# Patient Record
Sex: FEMALE | Race: BLACK OR AFRICAN AMERICAN | HISPANIC OR LATINO | URBAN - METROPOLITAN AREA
[De-identification: names, ages, dates, MRNs, and addresses within clinical notes are randomized per-mention and may not be internally consistent; named-entity substitution may affect disease eponyms.]

---

## 2023-10-06 ENCOUNTER — EMERGENCY (EMERGENCY)
Facility: HOSPITAL | Age: 58
LOS: 1 days | Discharge: HOME | End: 2023-10-06
Attending: EMERGENCY MEDICINE
Payer: MEDICAID

## 2023-10-06 VITALS
HEIGHT: 65 IN | DIASTOLIC BLOOD PRESSURE: 92 MMHG | WEIGHT: 160.06 LBS | OXYGEN SATURATION: 96 % | HEART RATE: 75 BPM | TEMPERATURE: 98 F | SYSTOLIC BLOOD PRESSURE: 150 MMHG | RESPIRATION RATE: 16 BRPM

## 2023-10-06 VITALS
DIASTOLIC BLOOD PRESSURE: 85 MMHG | TEMPERATURE: 98 F | RESPIRATION RATE: 18 BRPM | OXYGEN SATURATION: 96 % | SYSTOLIC BLOOD PRESSURE: 146 MMHG | HEART RATE: 68 BPM

## 2023-10-06 LAB
ALBUMIN SERPL ELPH-MCNC: 4.2 G/DL — SIGNIFICANT CHANGE UP (ref 3.3–5.2)
ALP SERPL-CCNC: 85 U/L — SIGNIFICANT CHANGE UP (ref 40–120)
ALT FLD-CCNC: 35 U/L — HIGH
ANION GAP SERPL CALC-SCNC: 11 MMOL/L — SIGNIFICANT CHANGE UP (ref 5–17)
AST SERPL-CCNC: 22 U/L — SIGNIFICANT CHANGE UP
BASOPHILS # BLD AUTO: 0.02 K/UL — SIGNIFICANT CHANGE UP (ref 0–0.2)
BASOPHILS NFR BLD AUTO: 0.4 % — SIGNIFICANT CHANGE UP (ref 0–2)
BILIRUB SERPL-MCNC: 0.6 MG/DL — SIGNIFICANT CHANGE UP (ref 0.4–2)
BUN SERPL-MCNC: 12.9 MG/DL — SIGNIFICANT CHANGE UP (ref 8–20)
CALCIUM SERPL-MCNC: 8.9 MG/DL — SIGNIFICANT CHANGE UP (ref 8.4–10.5)
CHLORIDE SERPL-SCNC: 101 MMOL/L — SIGNIFICANT CHANGE UP (ref 96–108)
CO2 SERPL-SCNC: 25 MMOL/L — SIGNIFICANT CHANGE UP (ref 22–29)
CREAT SERPL-MCNC: 0.53 MG/DL — SIGNIFICANT CHANGE UP (ref 0.5–1.3)
EGFR: 106 ML/MIN/1.73M2 — SIGNIFICANT CHANGE UP
EOSINOPHIL # BLD AUTO: 0.08 K/UL — SIGNIFICANT CHANGE UP (ref 0–0.5)
EOSINOPHIL NFR BLD AUTO: 1.6 % — SIGNIFICANT CHANGE UP (ref 0–6)
GLUCOSE SERPL-MCNC: 242 MG/DL — HIGH (ref 70–99)
HCT VFR BLD CALC: 41 % — SIGNIFICANT CHANGE UP (ref 34.5–45)
HGB BLD-MCNC: 13.7 G/DL — SIGNIFICANT CHANGE UP (ref 11.5–15.5)
IMM GRANULOCYTES NFR BLD AUTO: 0.2 % — SIGNIFICANT CHANGE UP (ref 0–0.9)
LYMPHOCYTES # BLD AUTO: 2.28 K/UL — SIGNIFICANT CHANGE UP (ref 1–3.3)
LYMPHOCYTES # BLD AUTO: 45.2 % — HIGH (ref 13–44)
MCHC RBC-ENTMCNC: 29.8 PG — SIGNIFICANT CHANGE UP (ref 27–34)
MCHC RBC-ENTMCNC: 33.4 GM/DL — SIGNIFICANT CHANGE UP (ref 32–36)
MCV RBC AUTO: 89.1 FL — SIGNIFICANT CHANGE UP (ref 80–100)
MONOCYTES # BLD AUTO: 0.47 K/UL — SIGNIFICANT CHANGE UP (ref 0–0.9)
MONOCYTES NFR BLD AUTO: 9.3 % — SIGNIFICANT CHANGE UP (ref 2–14)
NEUTROPHILS # BLD AUTO: 2.18 K/UL — SIGNIFICANT CHANGE UP (ref 1.8–7.4)
NEUTROPHILS NFR BLD AUTO: 43.3 % — SIGNIFICANT CHANGE UP (ref 43–77)
PLATELET # BLD AUTO: 264 K/UL — SIGNIFICANT CHANGE UP (ref 150–400)
POTASSIUM SERPL-MCNC: 3.9 MMOL/L — SIGNIFICANT CHANGE UP (ref 3.5–5.3)
POTASSIUM SERPL-SCNC: 3.9 MMOL/L — SIGNIFICANT CHANGE UP (ref 3.5–5.3)
PROT SERPL-MCNC: 6.6 G/DL — SIGNIFICANT CHANGE UP (ref 6.6–8.7)
RBC # BLD: 4.6 M/UL — SIGNIFICANT CHANGE UP (ref 3.8–5.2)
RBC # FLD: 12.1 % — SIGNIFICANT CHANGE UP (ref 10.3–14.5)
SODIUM SERPL-SCNC: 137 MMOL/L — SIGNIFICANT CHANGE UP (ref 135–145)
WBC # BLD: 5.04 K/UL — SIGNIFICANT CHANGE UP (ref 3.8–10.5)
WBC # FLD AUTO: 5.04 K/UL — SIGNIFICANT CHANGE UP (ref 3.8–10.5)

## 2023-10-06 PROCEDURE — 93971 EXTREMITY STUDY: CPT

## 2023-10-06 PROCEDURE — 85025 COMPLETE CBC W/AUTO DIFF WBC: CPT

## 2023-10-06 PROCEDURE — 73610 X-RAY EXAM OF ANKLE: CPT | Mod: 26,LT,76

## 2023-10-06 PROCEDURE — 99284 EMERGENCY DEPT VISIT MOD MDM: CPT

## 2023-10-06 PROCEDURE — 80053 COMPREHEN METABOLIC PANEL: CPT

## 2023-10-06 PROCEDURE — 36415 COLL VENOUS BLD VENIPUNCTURE: CPT

## 2023-10-06 PROCEDURE — 73630 X-RAY EXAM OF FOOT: CPT | Mod: 26,RT,76

## 2023-10-06 PROCEDURE — 82962 GLUCOSE BLOOD TEST: CPT

## 2023-10-06 PROCEDURE — 93971 EXTREMITY STUDY: CPT | Mod: 26,RT

## 2023-10-06 PROCEDURE — 73630 X-RAY EXAM OF FOOT: CPT

## 2023-10-06 PROCEDURE — 99285 EMERGENCY DEPT VISIT HI MDM: CPT

## 2023-10-06 PROCEDURE — 73610 X-RAY EXAM OF ANKLE: CPT

## 2023-10-06 RX ORDER — ISOPROPYL ALCOHOL, BENZOCAINE .7; .06 ML/ML; ML/ML
0 SWAB TOPICAL
Qty: 100 | Refills: 1
Start: 2023-10-06

## 2023-10-06 RX ORDER — IBUPROFEN 200 MG
1 TABLET ORAL
Qty: 28 | Refills: 0
Start: 2023-10-06 | End: 2023-10-12

## 2023-10-06 RX ORDER — IBUPROFEN 200 MG
600 TABLET ORAL ONCE
Refills: 0 | Status: COMPLETED | OUTPATIENT
Start: 2023-10-06 | End: 2023-10-06

## 2023-10-06 RX ORDER — ACETAMINOPHEN 500 MG
975 TABLET ORAL ONCE
Refills: 0 | Status: COMPLETED | OUTPATIENT
Start: 2023-10-06 | End: 2023-10-06

## 2023-10-06 RX ADMIN — Medication 975 MILLIGRAM(S): at 14:14

## 2023-10-06 RX ADMIN — Medication 600 MILLIGRAM(S): at 18:14

## 2023-10-06 NOTE — ED PROVIDER NOTE - PHYSICAL EXAMINATION
Gen: well appearing, no acute distress  Head: normocephalic, atraumatic  EENT: EOMI, moist mucous membranes  Lung: no increased work of breathing, clear to auscultation bilaterally, speaking in full sentences  CV: regular rate, regular rhythm, normal s1/s2, 2+ radial pulses bilaterally  Abd: soft, non-tender, non-distended  MSK: (+) b/l ankles with trace edema and (+) ttp, R>L. No significant warmth. FROM b/l LE. Sensation intact.   Neuro: Awake, alert, no focal neurologic deficits

## 2023-10-06 NOTE — ED PROVIDER NOTE - NSFOLLOWUPINSTRUCTIONS_ED_ALL_ED_FT
- You may take Tylenol extra strength 2 tablets every 6 hours or Ibuprofen 600mg (3 tablets) every 6 hours as needed for aches, pains.  - Take your diabetes medications as instructed.   - Elevate your feet above your heart  - Follow up with your primary care doctor

## 2023-10-06 NOTE — ED PROVIDER NOTE - PROGRESS NOTE DETAILS
Faiza: I discussed with patient via  Vinayak [id number]. I shared results of XR and labs, pending doppler. Patient states she is not often on her feet (nephew disagrees). Patient has point tenderness to the achilles and posterior aspect of the medial malleolus. She also has diffuse pain in her calf with dorsiflexion of her right ankle. She has full strength and sensation. She has trace pitting edema. She requests an ophthalmic exam not because of any specific complaint, but just because she is diabetic. I explained that she needs to follow up with an ophthalmologist. She is requesting diabetic supplies - will send to pharmacy. Arlet: pt signed outt o me pending u/s. feeling improved. no dka. has dm equipment at pharmacy. neg doppler. results given. return precautions.

## 2023-10-06 NOTE — ED ADULT NURSE NOTE - NSFALLRISKINTERV_ED_ALL_ED
Assistance OOB with selected safe patient handling equipment if applicable/Assistance with ambulation/Communicate fall risk and risk factors to all staff, patient, and family/Monitor gait and stability/Provide visual cue: yellow wristband, yellow gown, etc/Reinforce activity limits and safety measures with patient and family/Call bell, personal items and telephone in reach/Instruct patient to call for assistance before getting out of bed/chair/stretcher/Non-slip footwear applied when patient is off stretcher/[place] to call system/Physically safe environment - no spills, clutter or unnecessary equipment/Purposeful Proactive Rounding/Room/bathroom lighting operational, light cord in reach

## 2023-10-06 NOTE — ED PROVIDER NOTE - ATTENDING CONTRIBUTION TO CARE
I personally saw the patient with the resident, and completed the key components of the history and physical exam. I then discussed the management plan with the resident.    See progress note.    Likely arthritis - reassurance provided.

## 2023-10-06 NOTE — ED ADULT NURSE NOTE - OBJECTIVE STATEMENT
Pt presents to ED for b/l lower leg swelling x 3days. Pt states she is diabetic and new to the country, recently found a PCP, pending appointment. Pt request BP monitor and Glucometer.

## 2023-10-06 NOTE — ED PROVIDER NOTE - PATIENT PORTAL LINK FT
You can access the FollowMyHealth Patient Portal offered by [facility] by registering at the following website: [email or web address]. By joining [email or web address] FollowMyHealth portal, you will also be able to view your health information using other applications (apps) compatible with our system.

## 2023-10-06 NOTE — ED PROVIDER NOTE - CLINICAL SUMMARY MEDICAL DECISION MAKING FREE TEXT BOX
58 year old female recently diagnosed with DM , started on metformin presenting with 3 days of atraumatic ankle swelling and pain. Plan - pain control, labs, x-rays, US r/o clot, reassess.

## 2023-10-06 NOTE — ED PROVIDER NOTE - OBJECTIVE STATEMENT
58 year old female with PMHx NIDDM presenting with 3 days of b/l ankle pain and swelling. 58 year old female with PMHx recently diagnosed NIDDM presenting with 3 days of b/l ankle pain and swelling. Patient reports pain and swelling starting at ankles, feels they are spreading up her lower legs, R>L, atraumatic. Denies fever, chills, chest pain, shortness of breath. Denies recent procedures, travel, prolonged immobilization, leg swelling, hemoptysis, prior clots, exogenous estrogen use.    [facility]  ID: #[id number]

## 2023-10-06 NOTE — ED PROVIDER NOTE - NS ED ROS FT
Gen: No fever, no change in activity level  Resp: No cough, no trouble breathing  Cardiovascular: No chest pain, no palpitation  Gastrointestinal: No nausea, no vomiting, no diarrhea  MS: (+) b/l ankle swelling and (+) pain  Skin: No rashes  Neuro: No headache; no abnormal movements  Remainder negative, except as per the HPI

## 2024-01-16 ENCOUNTER — OFFICE (OUTPATIENT)
Dept: URBAN - METROPOLITAN AREA CLINIC 112 | Facility: CLINIC | Age: 59
Setting detail: OPHTHALMOLOGY
End: 2024-01-16
Payer: COMMERCIAL

## 2024-01-16 DIAGNOSIS — H26.491: ICD-10-CM

## 2024-01-16 DIAGNOSIS — H25.12: ICD-10-CM

## 2024-01-16 DIAGNOSIS — H40.1131: ICD-10-CM

## 2024-01-16 DIAGNOSIS — E11.3293: ICD-10-CM

## 2024-01-16 PROBLEM — H35.373 EPIRETINAL MEMBRANE; BOTH EYES: Status: ACTIVE | Noted: 2024-01-16

## 2024-01-16 PROBLEM — E11.3292 DM TYPE 2; RIGHT MILD WITHOUT ME, LEFT MILD WITHOUT ME: Status: ACTIVE | Noted: 2024-01-16

## 2024-01-16 PROBLEM — E11.3291 DM TYPE 2; RIGHT MILD WITHOUT ME, LEFT MILD WITHOUT ME: Status: ACTIVE | Noted: 2024-01-16

## 2024-01-16 PROBLEM — Z96.1 PSEUDOPHAKIA ; RIGHT EYE: Status: ACTIVE | Noted: 2024-01-16

## 2024-01-16 PROCEDURE — 99214 OFFICE O/P EST MOD 30 MIN: CPT | Performed by: OPHTHALMOLOGY

## 2024-01-16 PROCEDURE — 92136 OPHTHALMIC BIOMETRY: CPT | Performed by: OPHTHALMOLOGY

## 2024-01-16 PROCEDURE — 92134 CPTRZ OPH DX IMG PST SGM RTA: CPT | Performed by: OPHTHALMOLOGY

## 2024-01-16 ASSESSMENT — REFRACTION_CURRENTRX
OD_OVR_VA: 20/
OD_ADD: +2.25
OS_SPHERE: +1.75
OD_SPHERE: +1.00
OS_VPRISM_DIRECTION: PROGS
OS_ADD: +2.25
OD_CYLINDER: -0.25
OS_OVR_VA: 20/
OS_CYLINDER: -0.25
OS_AXIS: 122
OD_VPRISM_DIRECTION: PROGS
OD_AXIS: 151

## 2024-01-16 ASSESSMENT — REFRACTION_MANIFEST
OD_CYLINDER: -0.75
OD_SPHERE: +1.00
OD_VA1: 20/40
OS_ADD: +2.50
OD_CYLINDER: -0.50
OD_SPHERE: +0.75
OS_VA1: 20/40
OD_ADD: +2.50
OS_CYLINDER: -1.25
OS_AXIS: 096
OS_CYLINDER: -0.50
OS_SPHERE: +3.00
OS_SPHERE: +2.00
OD_AXIS: 95
OD_AXIS: 098
OS_AXIS: 100

## 2024-01-16 ASSESSMENT — CONFRONTATIONAL VISUAL FIELD TEST (CVF)
OD_FINDINGS: FULL
OS_FINDINGS: FULL

## 2024-01-16 ASSESSMENT — SPHEQUIV_DERIVED
OD_SPHEQUIV: 0.5
OD_SPHEQUIV: 0.625
OD_SPHEQUIV: 0.625
OS_SPHEQUIV: 1.75
OS_SPHEQUIV: 2.375
OS_SPHEQUIV: 2.375

## 2024-01-16 ASSESSMENT — REFRACTION_AUTOREFRACTION
OD_CYLINDER: -0.75
OS_AXIS: 096
OS_SPHERE: +3.00
OS_CYLINDER: -1.25
OD_AXIS: 098
OD_SPHERE: +1.00